# Patient Record
Sex: FEMALE | Race: WHITE
[De-identification: names, ages, dates, MRNs, and addresses within clinical notes are randomized per-mention and may not be internally consistent; named-entity substitution may affect disease eponyms.]

---

## 2022-06-28 ENCOUNTER — HOSPITAL ENCOUNTER (OUTPATIENT)
Dept: HOSPITAL 79 - CT | Age: 72
End: 2022-06-28
Attending: INTERNAL MEDICINE
Payer: MEDICARE

## 2022-06-28 DIAGNOSIS — I65.29: Primary | ICD-10-CM

## 2022-06-28 DIAGNOSIS — R90.89: ICD-10-CM

## 2022-06-28 DIAGNOSIS — H53.9: ICD-10-CM

## 2022-06-28 DIAGNOSIS — R90.82: ICD-10-CM

## 2022-08-26 ENCOUNTER — HOSPITAL ENCOUNTER (OUTPATIENT)
Dept: HOSPITAL 79 - LAB | Age: 72
End: 2022-08-26
Attending: INTERNAL MEDICINE
Payer: MEDICARE

## 2022-08-26 DIAGNOSIS — E83.52: ICD-10-CM

## 2022-08-26 DIAGNOSIS — N18.2: Primary | ICD-10-CM

## 2022-08-26 LAB — BUN/CREATININE RATIO: 17 (ref 0–10)

## 2022-08-30 ENCOUNTER — HOSPITAL ENCOUNTER (OUTPATIENT)
Dept: HOSPITAL 79 - OPSV2 | Age: 72
End: 2022-08-30
Attending: OBSTETRICS & GYNECOLOGY
Payer: MEDICARE

## 2022-08-30 DIAGNOSIS — N81.4: ICD-10-CM

## 2022-08-30 DIAGNOSIS — Z01.818: Primary | ICD-10-CM

## 2022-08-30 LAB
HGB BLD-MCNC: 12.2 GM/DL (ref 12.3–15.3)
RED BLOOD COUNT: 3.76 M/UL (ref 4–5.1)
WHITE BLOOD COUNT: 9.6 K/UL (ref 4.5–11)

## 2022-09-12 ENCOUNTER — HOSPITAL ENCOUNTER (OUTPATIENT)
Dept: HOSPITAL 79 - OR | Age: 72
LOS: 1 days | Discharge: HOME | End: 2022-09-13
Attending: OBSTETRICS & GYNECOLOGY
Payer: MEDICARE

## 2022-09-12 VITALS — WEIGHT: 171 LBS | BODY MASS INDEX: 28.49 KG/M2 | HEIGHT: 65 IN

## 2022-09-12 DIAGNOSIS — N72: ICD-10-CM

## 2022-09-12 DIAGNOSIS — N73.6: ICD-10-CM

## 2022-09-12 DIAGNOSIS — Z79.82: ICD-10-CM

## 2022-09-12 DIAGNOSIS — N83.331: ICD-10-CM

## 2022-09-12 DIAGNOSIS — N88.8: ICD-10-CM

## 2022-09-12 DIAGNOSIS — N81.3: Primary | ICD-10-CM

## 2022-09-12 DIAGNOSIS — Z87.891: ICD-10-CM

## 2022-09-12 DIAGNOSIS — N80.0: ICD-10-CM

## 2022-09-12 DIAGNOSIS — I10: ICD-10-CM

## 2022-09-12 DIAGNOSIS — E78.5: ICD-10-CM

## 2022-09-12 PROCEDURE — C1769 GUIDE WIRE: HCPCS

## 2022-09-13 NOTE — NUR
PATIENT HOME MED REC NOT RECONCILED FOR DISCHARGE.  NOTIFIED AND VO
ORDER OBTAINED TO CONTINUE ALL HOME MEDS.

## 2022-09-30 ENCOUNTER — HOSPITAL ENCOUNTER (OUTPATIENT)
Dept: HOSPITAL 79 - CT | Age: 72
End: 2022-09-30
Attending: STUDENT IN AN ORGANIZED HEALTH CARE EDUCATION/TRAINING PROGRAM
Payer: MEDICARE

## 2022-09-30 DIAGNOSIS — Z00.00: Primary | ICD-10-CM

## 2022-09-30 DIAGNOSIS — R90.82: ICD-10-CM

## 2022-09-30 DIAGNOSIS — H53.9: ICD-10-CM

## 2022-09-30 DIAGNOSIS — I65.23: ICD-10-CM
